# Patient Record
Sex: FEMALE | Race: OTHER | HISPANIC OR LATINO
[De-identification: names, ages, dates, MRNs, and addresses within clinical notes are randomized per-mention and may not be internally consistent; named-entity substitution may affect disease eponyms.]

---

## 2022-02-18 ENCOUNTER — ASOB RESULT (OUTPATIENT)
Age: 27
End: 2022-02-18

## 2022-02-18 ENCOUNTER — APPOINTMENT (OUTPATIENT)
Dept: MATERNAL FETAL MEDICINE | Facility: CLINIC | Age: 27
End: 2022-02-18
Payer: COMMERCIAL

## 2022-02-18 PROCEDURE — 99202 OFFICE O/P NEW SF 15 MIN: CPT | Mod: 95

## 2022-02-28 ENCOUNTER — ASOB RESULT (OUTPATIENT)
Age: 27
End: 2022-02-28

## 2022-02-28 ENCOUNTER — APPOINTMENT (OUTPATIENT)
Dept: ANTEPARTUM | Facility: CLINIC | Age: 27
End: 2022-02-28
Payer: COMMERCIAL

## 2022-02-28 ENCOUNTER — NON-APPOINTMENT (OUTPATIENT)
Age: 27
End: 2022-02-28

## 2022-02-28 PROCEDURE — 36415 COLL VENOUS BLD VENIPUNCTURE: CPT

## 2022-02-28 PROCEDURE — 76813 OB US NUCHAL MEAS 1 GEST: CPT

## 2022-02-28 PROCEDURE — ZZZZZ: CPT

## 2022-02-28 PROCEDURE — 36416 COLLJ CAPILLARY BLOOD SPEC: CPT

## 2022-03-02 LAB
1ST TRIMESTER DATA: NORMAL
ADDENDUM DOC: NORMAL
AFP PNL SERPL: NORMAL
AFP SERPL-ACNC: NORMAL
CLINICAL BIOCHEMIST REVIEW: NORMAL
FREE BETA HCG 1ST TRIMESTER: NORMAL
Lab: NORMAL
NASAL BONE: PRESENT
NOTES NTD: NORMAL
NT: NORMAL
PAPP-A SERPL-ACNC: NORMAL
TRISOMY 18/3: NORMAL

## 2022-03-08 ENCOUNTER — NON-APPOINTMENT (OUTPATIENT)
Age: 27
End: 2022-03-08

## 2022-03-15 ENCOUNTER — NON-APPOINTMENT (OUTPATIENT)
Age: 27
End: 2022-03-15

## 2022-03-15 DIAGNOSIS — Z87.59 PERSONAL HISTORY OF OTHER COMPLICATIONS OF PREGNANCY, CHILDBIRTH AND THE PUERPERIUM: ICD-10-CM

## 2022-03-15 DIAGNOSIS — Z78.9 OTHER SPECIFIED HEALTH STATUS: ICD-10-CM

## 2022-03-15 DIAGNOSIS — Z98.890 OTHER SPECIFIED POSTPROCEDURAL STATES: ICD-10-CM

## 2022-03-15 DIAGNOSIS — I10 ESSENTIAL (PRIMARY) HYPERTENSION: ICD-10-CM

## 2022-03-15 DIAGNOSIS — N91.2 AMENORRHEA, UNSPECIFIED: ICD-10-CM

## 2022-03-15 RX ORDER — CHROMIUM 200 MCG
TABLET ORAL
Refills: 0 | Status: ACTIVE | COMMUNITY

## 2022-03-15 RX ORDER — ASCORBIC ACID, CHOLECALCIFEROL, .ALPHA.-TOCOPHEROL ACETATE, DL-, PYRIDOXINE, FOLIC ACID, CYANOCOBALAMIN, CALCIUM, FERROUS FUMARATE, MAGNESIUM, DOCONEXENT 85; 200; 10; 25; 1; 12; 140; 27; 45; 300 [IU]/1; [IU]/1; [IU]/1; [IU]/1; MG/1; UG/1; MG/1; MG/1; MG/1; MG/1
CAPSULE, GELATIN COATED ORAL
Refills: 0 | Status: ACTIVE | COMMUNITY

## 2022-03-22 ENCOUNTER — APPOINTMENT (OUTPATIENT)
Dept: ANTEPARTUM | Facility: CLINIC | Age: 27
End: 2022-03-22
Payer: COMMERCIAL

## 2022-03-22 ENCOUNTER — NON-APPOINTMENT (OUTPATIENT)
Age: 27
End: 2022-03-22

## 2022-03-22 ENCOUNTER — APPOINTMENT (OUTPATIENT)
Dept: MATERNAL FETAL MEDICINE | Facility: CLINIC | Age: 27
End: 2022-03-22
Payer: COMMERCIAL

## 2022-03-22 VITALS
DIASTOLIC BLOOD PRESSURE: 82 MMHG | OXYGEN SATURATION: 99 % | RESPIRATION RATE: 18 BRPM | SYSTOLIC BLOOD PRESSURE: 132 MMHG | HEIGHT: 63 IN | HEART RATE: 99 BPM | BODY MASS INDEX: 42.7 KG/M2 | WEIGHT: 241 LBS

## 2022-03-22 DIAGNOSIS — Z82.49 FAMILY HISTORY OF ISCHEMIC HEART DISEASE AND OTHER DISEASES OF THE CIRCULATORY SYSTEM: ICD-10-CM

## 2022-03-22 PROCEDURE — 99215 OFFICE O/P EST HI 40 MIN: CPT

## 2022-03-22 PROCEDURE — 36415 COLL VENOUS BLD VENIPUNCTURE: CPT

## 2022-03-22 NOTE — DISCUSSION/SUMMARY
[FreeTextEntry1] : Patient is a 26-year-old -0-1-2 being seen today at 16 weeks for chronic hypertension and previous  section.\par \par Her obstetrical history is significant for delivery in 2018 of a liveborn male  weighing 8 pounds 10 ounces delivered at 39 weeks via elective  section.  That pregnancy was complicated by hypertension requiring medical intervention.  The  section was done secondary to a strong family history of cerebral aneurysm.  The patient has not had a study done to confirm that she has a cerebral aneurysm.  Subsequent pregnancy in 2019, a liveborn male  weighing 8 pounds 14 ounces delivered at 39 weeks via elective  section.  That pregnancy was also complicated by gestational hypertension treated with labetalol.  She has also had 1 first trimester complete miscarriage.\par \par Ultrasound was not performed today, but fetal cardiac activity was documented.  Vital signs today reveal a blood pressure of 132/82, maternal weight is 241 pounds consistent with a BMI of 42.69 kg.\par \par Chronic hypertension;\par \par The patient is currently on 200 mg of labetalol twice a day.  Blood pressure in pregnancy in patients with chronic hypertension should be 120-140 systolic over 80-90 diastolic.  The patient understands that should her systolic blood pressure be greater than 160 or diastolic blood pressure greater than 110 she should call the office for further evaluation.  We can utilize up to 2400 mg of labetalol daily.  In addition this patient is at increased risk for superimposed preeclampsia and she is currently on 1 low-dose aspirin a day.  She was advised to alternate 1 tablet 1 day and 2 tablets the other day which she will take until approximately 1 week prior to delivery.  Patient's with chronic hypertension are best delivered between 37 and 39 weeks.  I recommend baseline preeclamptic blood work and urine protein creatinine ratio be obtained at 20 weeks.  Rachel understands that poor fetal growth may occur and growth scans done monthly beginning at approximate 24 weeks is also recommended.  The possibility of  delivery with it's associated morbidity and mortality were discussed.  She will return for follow-up maternal-fetal medicine consultation at 24 weeks.  A growth scan will be done at that time.  Signs and symptoms of preeclampsia were discussed.  All the above was discussed with the patient, all her questions were answered.\par \par The patient has a BMI that is greater than 40 and a first-degree relative with diabetes and therefore 3-hour glucose tolerance test between 24 and 28 weeks is recommended.\par \par She has had ultrascreen evaluation and NIPS blood work performed which both demonstate low risk for fetal aneuploidy.  Sequential blood work was drawn today.\par \par COVID-19 vaccination;\par \par COVID-19 vaccination in pregnancy was discussed.  We advise pregnant patients to be vaccinated.  The patient has certain comorbidities that put her at increased risk for problems and/or complications should she have a Covid infection during pregnancy.  Risk and benefits of the vaccination were discussed and after all the patient's questions were answered the patient has declined vaccination during pregnancy.\par \par Her mother has hypertensive disease and her father has diabetes.  She has had 2  sections, rhinoplasty and an umbilical hernia repair.  She has no known allergies to medications and denies alcohol, tobacco or drug use.\par \par I spent a total of 47 minutes reviewing the patient's prenatal records, outside medical records, prenatal blood work and ultrasound reports counseling and coordinating care.\par \par Recommendations;\par \par 1.  Continue 200 mg of labetalol twice a day.\par 2.  Low-dose aspirin 1 tablet on odd days and two tablets on even days.\par 3.  Baseline preeclamptic blood work and urine protein creatinine ratio at 20 weeks.\par 4.  Sequential blood work drawn on our office today.\par 5.  Comprehensive ultrasound at 20 weeks is recommended.\par 6.  Growth scan and maternal-fetal medicine consultation in 8 weeks.\par 7.  3 hour glucose tolerance test between 24 and 28 weeks is recommended.

## 2022-03-23 ENCOUNTER — NON-APPOINTMENT (OUTPATIENT)
Age: 27
End: 2022-03-23

## 2022-03-24 ENCOUNTER — NON-APPOINTMENT (OUTPATIENT)
Age: 27
End: 2022-03-24

## 2022-03-25 LAB
1ST TRIMESTER DATA: NORMAL
2ND TRIMESTER DATA: NORMAL
ADDENDUM DOC: NORMAL
AFP PNL SERPL: NORMAL
AFP SERPL-ACNC: NORMAL
AFP SERPL-ACNC: NORMAL
B-HCG FREE SERPL-MCNC: NORMAL
CLINICAL BIOCHEMIST REVIEW: NORMAL
FREE BETA HCG 1ST TRIMESTER: NORMAL
INHIBIN A SERPL-MCNC: NORMAL
NASAL BONE: PRESENT
NOTES NTD: NORMAL
NT: NORMAL
PAPP-A SERPL-ACNC: NORMAL
U ESTRIOL SERPL-SCNC: NORMAL

## 2022-03-28 ENCOUNTER — NON-APPOINTMENT (OUTPATIENT)
Age: 27
End: 2022-03-28

## 2022-03-29 ENCOUNTER — APPOINTMENT (OUTPATIENT)
Dept: OBGYN | Facility: CLINIC | Age: 27
End: 2022-03-29
Payer: COMMERCIAL

## 2022-03-29 VITALS
WEIGHT: 247 LBS | SYSTOLIC BLOOD PRESSURE: 138 MMHG | HEART RATE: 91 BPM | DIASTOLIC BLOOD PRESSURE: 79 MMHG | BODY MASS INDEX: 43.75 KG/M2

## 2022-03-29 DIAGNOSIS — Z00.00 ENCOUNTER FOR GENERAL ADULT MEDICAL EXAMINATION W/OUT ABNORMAL FINDINGS: ICD-10-CM

## 2022-03-29 LAB
BILIRUB UR QL STRIP: NORMAL
GLUCOSE UR-MCNC: NORMAL
HCG UR QL: 0.2 EU/DL
HGB UR QL STRIP.AUTO: NORMAL
KETONES UR-MCNC: NORMAL
LEUKOCYTE ESTERASE UR QL STRIP: NORMAL
NITRITE UR QL STRIP: NORMAL
PH UR STRIP: 5.5
PROT UR STRIP-MCNC: NORMAL
SP GR UR STRIP: 1.03

## 2022-03-29 PROCEDURE — 0502F SUBSEQUENT PRENATAL CARE: CPT

## 2022-04-01 LAB — BACTERIA UR CULT: NORMAL

## 2022-04-19 ENCOUNTER — APPOINTMENT (OUTPATIENT)
Dept: ANTEPARTUM | Facility: CLINIC | Age: 27
End: 2022-04-19
Payer: MEDICAID

## 2022-04-19 ENCOUNTER — ASOB RESULT (OUTPATIENT)
Age: 27
End: 2022-04-19

## 2022-04-19 PROCEDURE — 76827 ECHO EXAM OF FETAL HEART: CPT

## 2022-04-19 PROCEDURE — 76811 OB US DETAILED SNGL FETUS: CPT

## 2022-04-25 ENCOUNTER — NON-APPOINTMENT (OUTPATIENT)
Age: 27
End: 2022-04-25

## 2022-04-26 ENCOUNTER — APPOINTMENT (OUTPATIENT)
Dept: OBGYN | Facility: CLINIC | Age: 27
End: 2022-04-26

## 2022-04-26 ENCOUNTER — APPOINTMENT (OUTPATIENT)
Dept: OBGYN | Facility: CLINIC | Age: 27
End: 2022-04-26
Payer: MEDICAID

## 2022-04-26 VITALS
HEART RATE: 114 BPM | SYSTOLIC BLOOD PRESSURE: 137 MMHG | BODY MASS INDEX: 44.11 KG/M2 | WEIGHT: 249 LBS | DIASTOLIC BLOOD PRESSURE: 91 MMHG

## 2022-04-26 LAB
BILIRUB UR QL STRIP: NORMAL
GLUCOSE UR-MCNC: NORMAL
HCG UR QL: 0.2 EU/DL
HGB UR QL STRIP.AUTO: NORMAL
KETONES UR-MCNC: NORMAL
LEUKOCYTE ESTERASE UR QL STRIP: NORMAL
NITRITE UR QL STRIP: NORMAL
PH UR STRIP: 6
PROT UR STRIP-MCNC: NORMAL
SP GR UR STRIP: 1.03

## 2022-04-26 PROCEDURE — 99213 OFFICE O/P EST LOW 20 MIN: CPT

## 2022-04-27 LAB
APPEARANCE: CLEAR
BILIRUBIN URINE: NEGATIVE
BLOOD URINE: NORMAL
COLOR: YELLOW
GLUCOSE QUALITATIVE U: NEGATIVE
KETONES URINE: NEGATIVE
LEUKOCYTE ESTERASE URINE: ABNORMAL
NITRITE URINE: NEGATIVE
PH URINE: 6.5
PROTEIN URINE: NORMAL
SPECIFIC GRAVITY URINE: 1.02
UROBILINOGEN URINE: NORMAL

## 2022-04-28 LAB — BACTERIA UR CULT: NORMAL

## 2022-05-13 ENCOUNTER — NON-APPOINTMENT (OUTPATIENT)
Age: 27
End: 2022-05-13

## 2022-05-17 ENCOUNTER — APPOINTMENT (OUTPATIENT)
Dept: ANTEPARTUM | Facility: CLINIC | Age: 27
End: 2022-05-17
Payer: MEDICAID

## 2022-05-17 ENCOUNTER — APPOINTMENT (OUTPATIENT)
Dept: MATERNAL FETAL MEDICINE | Facility: CLINIC | Age: 27
End: 2022-05-17
Payer: MEDICAID

## 2022-05-17 ENCOUNTER — ASOB RESULT (OUTPATIENT)
Age: 27
End: 2022-05-17

## 2022-05-17 VITALS
OXYGEN SATURATION: 98 % | SYSTOLIC BLOOD PRESSURE: 134 MMHG | BODY MASS INDEX: 43.41 KG/M2 | HEART RATE: 102 BPM | WEIGHT: 245 LBS | HEIGHT: 63 IN | RESPIRATION RATE: 18 BRPM | DIASTOLIC BLOOD PRESSURE: 86 MMHG

## 2022-05-17 PROCEDURE — 99213 OFFICE O/P EST LOW 20 MIN: CPT

## 2022-05-17 PROCEDURE — 76816 OB US FOLLOW-UP PER FETUS: CPT

## 2022-05-17 NOTE — DISCUSSION/SUMMARY
[FreeTextEntry1] : Please she see the previous consultation for the patient's medical and obstetrical history.  The patient is being seen today at 24 weeks for chronic hypertension, maternal obesity and previous history of  section.\par \par Growth scan was performed today and reveals a single viable intrauterine gestation with the estimated fetal weight of 1 pound 10 ounce which is consistent with the 60th percentile.  Vertex presentation with an anterior placenta seen and the amniotic fluid index is normal at 14.75 cm.  Vital signs today reveal a blood pressure 134/86 and maternal weight is 245 pounds which is consistent with a BMI of 43.4 kg.\par \par Chronic hypertension;\par \par Patient is taking 200 mg of labetalol twice a day.  Evaluation of her prenatal chart reveals that her blood pressure is running 132-138/79-91.  This is appropriate blood pressure for a pregnant patient on medication for chronic hypertension.  At this time the patient will continue on 200 mg of labetalol twice a day.  In addition she is on low-dose aspirin taking 1 tablet on odd days and 2 tablet on even days which she will take until approximately 1 week prior to delivery.  A growth scan in 1 month is recommended.  Baseline preeclamptic blood work was performed in your office and was found to be within normal limits.  Signs and symptoms of superimposed preeclampsia were discussed.  The patient was advised to test her blood pressure at home.  Should her systolic blood pressure be greater than 160 or diastolic blood pressure be greater than 110 she will call the office for evaluation.  She understands that there is a possible need for  delivery secondary to chronic hypertension and/or superimposed preeclampsia.  Delivery between 37 and 39 weeks will be recommended.  All the above was discussed with the patient, all of her questions were answered.\par \par The patient's BMI is 43.4kg.  I do recommend a 3-hour glucose tolerance test be performed between 24 and 28 weeks.\par \par COVID-19 vaccination;\par \par COVID-19 vaccination in pregnancy was again discussed.  Risks and benefits of the vaccination were again discussed and after all the patient's questions were answered the patient continues to decline vaccination during pregnancy. She understands that she has a comorbidity that puts her at increased risk for problems or complications should she have Covid infection during pregnancy.\par \par I spent a total of 28 minutes reviewing the patient's prenatal record, prenatal blood work, previous consultations and ultrasound reports counseling and coordinating care.\par \par Recommendations;\par \par 1.  Continue labetalol 200 mg twice a day.\par 2.  Patient will call your office if her systolic blood pressure is greater than 160 or diastolic blood pressure is greater than 110.\par 3.  Continue low-dose aspirin until 1 week prior to delivery.\par 4.  Growth scan in 1 month is recommended.\par 5.  3-hour glucose tolerance test between 24 and 28 weeks is suggested.

## 2022-05-24 ENCOUNTER — APPOINTMENT (OUTPATIENT)
Dept: OBGYN | Facility: CLINIC | Age: 27
End: 2022-05-24
Payer: MEDICAID

## 2022-05-24 VITALS
HEART RATE: 98 BPM | DIASTOLIC BLOOD PRESSURE: 86 MMHG | SYSTOLIC BLOOD PRESSURE: 135 MMHG | WEIGHT: 252 LBS | BODY MASS INDEX: 44.64 KG/M2

## 2022-05-24 PROBLEM — Z00.00 ENCOUNTER FOR PREVENTIVE HEALTH EXAMINATION: Noted: 2022-05-24

## 2022-05-24 PROCEDURE — 0502F SUBSEQUENT PRENATAL CARE: CPT

## 2022-06-14 ENCOUNTER — APPOINTMENT (OUTPATIENT)
Dept: OBGYN | Facility: CLINIC | Age: 27
End: 2022-06-14
Payer: MEDICAID

## 2022-06-14 VITALS
DIASTOLIC BLOOD PRESSURE: 84 MMHG | WEIGHT: 249 LBS | BODY MASS INDEX: 44.11 KG/M2 | SYSTOLIC BLOOD PRESSURE: 142 MMHG | HEART RATE: 100 BPM

## 2022-06-14 PROCEDURE — 0502F SUBSEQUENT PRENATAL CARE: CPT

## 2022-06-17 ENCOUNTER — ASOB RESULT (OUTPATIENT)
Age: 27
End: 2022-06-17

## 2022-06-17 ENCOUNTER — APPOINTMENT (OUTPATIENT)
Dept: ANTEPARTUM | Facility: CLINIC | Age: 27
End: 2022-06-17
Payer: MEDICAID

## 2022-06-17 PROCEDURE — 76816 OB US FOLLOW-UP PER FETUS: CPT

## 2022-06-17 PROCEDURE — 76820 UMBILICAL ARTERY ECHO: CPT

## 2022-06-17 PROCEDURE — 93976 VASCULAR STUDY: CPT

## 2022-06-17 PROCEDURE — 76819 FETAL BIOPHYS PROFIL W/O NST: CPT

## 2022-06-20 ENCOUNTER — NON-APPOINTMENT (OUTPATIENT)
Age: 27
End: 2022-06-20

## 2022-06-23 ENCOUNTER — NON-APPOINTMENT (OUTPATIENT)
Age: 27
End: 2022-06-23

## 2022-07-05 ENCOUNTER — APPOINTMENT (OUTPATIENT)
Dept: OBGYN | Facility: CLINIC | Age: 27
End: 2022-07-05

## 2022-07-05 VITALS
SYSTOLIC BLOOD PRESSURE: 136 MMHG | BODY MASS INDEX: 44.64 KG/M2 | WEIGHT: 252 LBS | DIASTOLIC BLOOD PRESSURE: 86 MMHG | HEART RATE: 103 BPM

## 2022-07-05 LAB
BILIRUB UR QL STRIP: NORMAL
GLUCOSE UR-MCNC: NORMAL
HCG UR QL: 0.2 EU/DL
HGB UR QL STRIP.AUTO: NORMAL
KETONES UR-MCNC: NORMAL
LEUKOCYTE ESTERASE UR QL STRIP: NORMAL
NITRITE UR QL STRIP: NORMAL
PH UR STRIP: 6.5
PROT UR STRIP-MCNC: NORMAL
SP GR UR STRIP: 1.02

## 2022-07-05 PROCEDURE — 0502F SUBSEQUENT PRENATAL CARE: CPT

## 2022-07-06 LAB
APPEARANCE: CLEAR
BILIRUBIN URINE: NEGATIVE
BLOOD URINE: ABNORMAL
COLOR: YELLOW
GLUCOSE QUALITATIVE U: NEGATIVE
KETONES URINE: NEGATIVE
LEUKOCYTE ESTERASE URINE: ABNORMAL
NITRITE URINE: NEGATIVE
PH URINE: 6.5
PROTEIN URINE: NORMAL
SPECIFIC GRAVITY URINE: 1.02
UROBILINOGEN URINE: NORMAL

## 2022-07-07 LAB — BACTERIA UR CULT: NORMAL

## 2022-07-12 ENCOUNTER — ASOB RESULT (OUTPATIENT)
Age: 27
End: 2022-07-12

## 2022-07-12 ENCOUNTER — APPOINTMENT (OUTPATIENT)
Dept: ANTEPARTUM | Facility: CLINIC | Age: 27
End: 2022-07-12

## 2022-07-12 PROCEDURE — 76816 OB US FOLLOW-UP PER FETUS: CPT

## 2022-07-12 PROCEDURE — 76819 FETAL BIOPHYS PROFIL W/O NST: CPT

## 2022-07-15 DIAGNOSIS — Z3A.16 16 WEEKS GESTATION OF PREGNANCY: ICD-10-CM

## 2022-07-15 RX ORDER — ASPIRIN 81 MG/1
81 TABLET, CHEWABLE ORAL DAILY
Qty: 30 | Refills: 6 | Status: ACTIVE | COMMUNITY
Start: 2022-03-22

## 2022-07-15 RX ORDER — LABETALOL HYDROCHLORIDE 200 MG/1
200 TABLET, FILM COATED ORAL
Qty: 60 | Refills: 0 | Status: ACTIVE | COMMUNITY
Start: 2022-04-25

## 2022-07-15 RX ORDER — LABETALOL HYDROCHLORIDE 200 MG/1
200 TABLET, FILM COATED ORAL TWICE DAILY
Refills: 0 | Status: DISCONTINUED | COMMUNITY
End: 2022-07-15

## 2022-07-19 ENCOUNTER — ASOB RESULT (OUTPATIENT)
Age: 27
End: 2022-07-19

## 2022-07-19 ENCOUNTER — APPOINTMENT (OUTPATIENT)
Dept: OBGYN | Facility: CLINIC | Age: 27
End: 2022-07-19

## 2022-07-19 ENCOUNTER — APPOINTMENT (OUTPATIENT)
Dept: ANTEPARTUM | Facility: CLINIC | Age: 27
End: 2022-07-19

## 2022-07-19 VITALS
SYSTOLIC BLOOD PRESSURE: 131 MMHG | BODY MASS INDEX: 44.64 KG/M2 | HEART RATE: 98 BPM | DIASTOLIC BLOOD PRESSURE: 81 MMHG | WEIGHT: 252 LBS

## 2022-07-19 LAB
BILIRUB UR QL STRIP: NORMAL
GLUCOSE UR-MCNC: NORMAL
HCG UR QL: 0.2 EU/DL
HGB UR QL STRIP.AUTO: NORMAL
KETONES UR-MCNC: NORMAL
LEUKOCYTE ESTERASE UR QL STRIP: NORMAL
NITRITE UR QL STRIP: NORMAL
PH UR STRIP: 7
PROT UR STRIP-MCNC: NORMAL
SP GR UR STRIP: 1.01

## 2022-07-19 PROCEDURE — 76818 FETAL BIOPHYS PROFILE W/NST: CPT

## 2022-07-19 PROCEDURE — 0502F SUBSEQUENT PRENATAL CARE: CPT

## 2022-07-19 PROCEDURE — ZZZZZ: CPT

## 2022-07-21 ENCOUNTER — NON-APPOINTMENT (OUTPATIENT)
Age: 27
End: 2022-07-21

## 2022-07-26 ENCOUNTER — APPOINTMENT (OUTPATIENT)
Dept: ANTEPARTUM | Facility: CLINIC | Age: 27
End: 2022-07-26

## 2022-07-26 ENCOUNTER — ASOB RESULT (OUTPATIENT)
Age: 27
End: 2022-07-26

## 2022-07-26 PROCEDURE — 76818 FETAL BIOPHYS PROFILE W/NST: CPT

## 2022-07-26 PROCEDURE — ZZZZZ: CPT

## 2022-08-02 ENCOUNTER — ASOB RESULT (OUTPATIENT)
Age: 27
End: 2022-08-02

## 2022-08-02 ENCOUNTER — APPOINTMENT (OUTPATIENT)
Dept: OBGYN | Facility: CLINIC | Age: 27
End: 2022-08-02

## 2022-08-02 ENCOUNTER — APPOINTMENT (OUTPATIENT)
Dept: ANTEPARTUM | Facility: CLINIC | Age: 27
End: 2022-08-02

## 2022-08-02 VITALS
WEIGHT: 250 LBS | DIASTOLIC BLOOD PRESSURE: 85 MMHG | HEART RATE: 104 BPM | SYSTOLIC BLOOD PRESSURE: 139 MMHG | BODY MASS INDEX: 44.29 KG/M2

## 2022-08-02 PROCEDURE — 76816 OB US FOLLOW-UP PER FETUS: CPT

## 2022-08-02 PROCEDURE — 76818 FETAL BIOPHYS PROFILE W/NST: CPT

## 2022-08-02 PROCEDURE — 0502F SUBSEQUENT PRENATAL CARE: CPT

## 2022-08-02 PROCEDURE — ZZZZZ: CPT

## 2022-08-03 LAB
APPEARANCE: ABNORMAL
BILIRUBIN URINE: NEGATIVE
BLOOD URINE: NEGATIVE
COLOR: YELLOW
GLUCOSE QUALITATIVE U: NEGATIVE
KETONES URINE: NEGATIVE
LEUKOCYTE ESTERASE URINE: ABNORMAL
NITRITE URINE: NEGATIVE
PH URINE: 6.5
PROTEIN URINE: ABNORMAL
SPECIFIC GRAVITY URINE: 1.02
UROBILINOGEN URINE: NORMAL

## 2022-08-04 LAB — BACTERIA UR CULT: NORMAL

## 2022-08-05 ENCOUNTER — APPOINTMENT (OUTPATIENT)
Dept: ANTEPARTUM | Facility: CLINIC | Age: 27
End: 2022-08-05

## 2022-08-05 LAB — B-HEM STREP SPEC QL CULT: NORMAL

## 2022-08-09 ENCOUNTER — APPOINTMENT (OUTPATIENT)
Dept: OBGYN | Facility: CLINIC | Age: 27
End: 2022-08-09

## 2022-08-09 ENCOUNTER — APPOINTMENT (OUTPATIENT)
Dept: ANTEPARTUM | Facility: CLINIC | Age: 27
End: 2022-08-09

## 2022-08-09 ENCOUNTER — ASOB RESULT (OUTPATIENT)
Age: 27
End: 2022-08-09

## 2022-08-09 VITALS
WEIGHT: 254 LBS | BODY MASS INDEX: 44.99 KG/M2 | HEART RATE: 105 BPM | SYSTOLIC BLOOD PRESSURE: 133 MMHG | DIASTOLIC BLOOD PRESSURE: 91 MMHG

## 2022-08-09 PROCEDURE — 76818 FETAL BIOPHYS PROFILE W/NST: CPT

## 2022-08-09 PROCEDURE — ZZZZZ: CPT

## 2022-08-09 PROCEDURE — 0502F SUBSEQUENT PRENATAL CARE: CPT

## 2022-08-12 ENCOUNTER — APPOINTMENT (OUTPATIENT)
Dept: ANTEPARTUM | Facility: CLINIC | Age: 27
End: 2022-08-12

## 2022-08-12 ENCOUNTER — ASOB RESULT (OUTPATIENT)
Age: 27
End: 2022-08-12

## 2022-08-12 PROCEDURE — 76816 OB US FOLLOW-UP PER FETUS: CPT

## 2022-08-16 ENCOUNTER — APPOINTMENT (OUTPATIENT)
Dept: ANTEPARTUM | Facility: CLINIC | Age: 27
End: 2022-08-16

## 2022-08-16 ENCOUNTER — APPOINTMENT (OUTPATIENT)
Dept: OBGYN | Facility: CLINIC | Age: 27
End: 2022-08-16

## 2022-08-16 ENCOUNTER — ASOB RESULT (OUTPATIENT)
Age: 27
End: 2022-08-16

## 2022-08-16 VITALS
SYSTOLIC BLOOD PRESSURE: 133 MMHG | BODY MASS INDEX: 44.64 KG/M2 | WEIGHT: 252 LBS | DIASTOLIC BLOOD PRESSURE: 90 MMHG | HEART RATE: 99 BPM

## 2022-08-16 LAB
BILIRUB UR QL STRIP: NORMAL
GLUCOSE UR-MCNC: NORMAL
HCG UR QL: 0.2 EU/DL
HGB UR QL STRIP.AUTO: NORMAL
KETONES UR-MCNC: NORMAL
LEUKOCYTE ESTERASE UR QL STRIP: NORMAL
NITRITE UR QL STRIP: NORMAL
PH UR STRIP: 6
PROT UR STRIP-MCNC: NORMAL
SP GR UR STRIP: 1.02

## 2022-08-16 PROCEDURE — 93976 VASCULAR STUDY: CPT

## 2022-08-16 PROCEDURE — ZZZZZ: CPT

## 2022-08-16 PROCEDURE — 76820 UMBILICAL ARTERY ECHO: CPT

## 2022-08-16 PROCEDURE — 76821 MIDDLE CEREBRAL ARTERY ECHO: CPT

## 2022-08-16 PROCEDURE — 0502F SUBSEQUENT PRENATAL CARE: CPT

## 2022-08-16 PROCEDURE — 76818 FETAL BIOPHYS PROFILE W/NST: CPT

## 2022-08-17 LAB
APPEARANCE: CLEAR
BILIRUBIN URINE: NEGATIVE
BLOOD URINE: NORMAL
COLOR: NORMAL
GLUCOSE QUALITATIVE U: NEGATIVE
KETONES URINE: NEGATIVE
LEUKOCYTE ESTERASE URINE: NEGATIVE
NITRITE URINE: NEGATIVE
PH URINE: 6
PROTEIN URINE: NEGATIVE
SPECIFIC GRAVITY URINE: 1.02
UROBILINOGEN URINE: NORMAL

## 2022-08-18 LAB — BACTERIA UR CULT: NORMAL

## 2022-08-19 ENCOUNTER — APPOINTMENT (OUTPATIENT)
Dept: ANTEPARTUM | Facility: CLINIC | Age: 27
End: 2022-08-19

## 2022-08-23 ENCOUNTER — APPOINTMENT (OUTPATIENT)
Dept: OBGYN | Facility: CLINIC | Age: 27
End: 2022-08-23

## 2022-08-23 ENCOUNTER — ASOB RESULT (OUTPATIENT)
Age: 27
End: 2022-08-23

## 2022-08-23 ENCOUNTER — APPOINTMENT (OUTPATIENT)
Dept: ANTEPARTUM | Facility: CLINIC | Age: 27
End: 2022-08-23

## 2022-08-23 VITALS
SYSTOLIC BLOOD PRESSURE: 138 MMHG | WEIGHT: 252 LBS | DIASTOLIC BLOOD PRESSURE: 86 MMHG | BODY MASS INDEX: 44.64 KG/M2 | HEART RATE: 84 BPM

## 2022-08-23 LAB
BILIRUB UR QL STRIP: NORMAL
GLUCOSE UR-MCNC: NORMAL
HCG UR QL: 0.2 EU/DL
HGB UR QL STRIP.AUTO: NORMAL
KETONES UR-MCNC: NORMAL
LEUKOCYTE ESTERASE UR QL STRIP: NORMAL
NITRITE UR QL STRIP: NORMAL
PH UR STRIP: 7
PROT UR STRIP-MCNC: NORMAL
SP GR UR STRIP: 1.02

## 2022-08-23 PROCEDURE — 0502F SUBSEQUENT PRENATAL CARE: CPT

## 2022-08-23 PROCEDURE — 76818 FETAL BIOPHYS PROFILE W/NST: CPT

## 2022-08-23 PROCEDURE — ZZZZZ: CPT

## 2022-08-24 ENCOUNTER — OUTPATIENT (OUTPATIENT)
Dept: OUTPATIENT SERVICES | Facility: HOSPITAL | Age: 27
LOS: 1 days | End: 2022-08-24

## 2022-08-24 DIAGNOSIS — Z01.818 ENCOUNTER FOR OTHER PREPROCEDURAL EXAMINATION: ICD-10-CM

## 2022-08-24 LAB — HIV 1 & 2 AB SERPL IA.RAPID: SIGNIFICANT CHANGE UP

## 2022-08-25 LAB
HIV 1+2 AB+HIV1 P24 AG SERPL QL IA: SIGNIFICANT CHANGE UP
MEV IGG SER-ACNC: 47.4 AU/ML — SIGNIFICANT CHANGE UP
MEV IGG+IGM SER-IMP: POSITIVE — SIGNIFICANT CHANGE UP

## 2022-08-26 ENCOUNTER — APPOINTMENT (OUTPATIENT)
Dept: ANTEPARTUM | Facility: CLINIC | Age: 27
End: 2022-08-26

## 2022-08-29 RX ORDER — OXYTOCIN 10 UNIT/ML
333.33 VIAL (ML) INJECTION
Qty: 20 | Refills: 0 | Status: DISCONTINUED | OUTPATIENT
Start: 2022-08-31 | End: 2022-09-02

## 2022-08-29 RX ORDER — LABETALOL HCL 100 MG
200 TABLET ORAL
Refills: 0 | Status: DISCONTINUED | OUTPATIENT
Start: 2022-08-31 | End: 2022-09-02

## 2022-08-29 NOTE — OB PROVIDER H&P - HISTORY OF PRESENT ILLNESS
Darren Rachel Damico is a 27 year old  at 39w3d by LMP who presents to L&D for rCS, breech on  and complicated by cHTN on labetalol 200 BID and suspected fetal macrosomia EFW 92%ile on .      AL: 22   LMP: 21      Pregnancy course:   Breech    cHTN on labetalol with elevated uterine artery Doppler resolved 22   Suspected fetal macrosomia EFW 92%ile on    Breech   Low estriol 0.5%ile      Obhx:   G1 () SAB with D&C   G2 () elective pCS complicated by gHTN   G3 () rCS complicated by gHTN on labetalol   Gynhx: Denies abnormal Paps, STIs   Pmhx: Denies   Pshx: C/Sx2, D&C, umbilical hernia repair, rhinoplasty, wrist cyst removal   Meds: Labetalol 200 BID, ASA, PNV   Allergies: NKDA   SocialHx: Denies alcohol, tobacco, illicit drug use during pregnancy      BMI: 44.63   Ultrasound : breech, posterior   EFW : 3494, 92%ile  Darren Rachel Damico is a 27 year old  at 39w3d by LMP who presents to L&D for rCS, breech on  and complicated by cHTN on labetalol 200 BID and suspected fetal macrosomia EFW 92%ile on .      AL: 22   LMP: 21      Pregnancy course:   Breech    cHTN on labetalol with elevated uterine artery Doppler resolved 22   Suspected fetal macrosomia EFW 92%ile on    Low estriol 0.5%ile        Obhx:   G1 () SAB, no intervention   G2 () elective pCS, FHx of aneurysms, complicated by gHTN   G3 () rCS complicated by gHTN on labetalol   Gynhx: Denies abnormal Paps, STIs   Pmhx: Denies   Pshx: C/Sx2, D&C, umbilical hernia repair, rhinoplasty, wrist cyst removal   Meds: Labetalol 200 BID, ASA, PNV   Allergies: NKDA   SocialHx: Denies alcohol, tobacco, illicit drug use during pregnancy      BMI: 44.63   Ultrasound : breech, posterior   EFW : 3494, 92%ile  Darren Rachel Damico is a 27 year old  at 39w3d by LMP who presents to L&D for scheduled rCS. Patient denies LOF, VB, and contractions. Reports good fetal movements. Denies fever chills, cough, headaches, SOB, CP, abdominal pain, urinary symptoms or new-onset swelling. No other concerns at this time.    AL: 22   LMP: 21      Pregnancy course:   Breech presentation , now resolved  cHTN on labetalol 200 BID  Elevated uterine artery Doppler resolved 22   Suspected fetal macrosomia EFW 92%ile on    Low estriol 0.5%ile        Obhx:   G1 () SAB, no intervention   G2 () elective pCS, FHx of aneurysms, complicated by gHTN   G3 () rCS complicated by gHTN on labetalol   Gynhx: Denies abnormal Paps, STIs   Pmhx: Denies   Pshx: C/Sx2, umbilical hernia repair, rhinoplasty, wrist cyst removal   Meds: Labetalol 200 BID, ASA, PNV   Allergies: NKDA   SocialHx: Denies alcohol, tobacco, illicit drug use during pregnancy      BMI: 44.63   Ultrasound : breech, posterior   EFW : 3494, 92%ile

## 2022-08-29 NOTE — OB PROVIDER H&P - NSHPPHYSICALEXAM_GEN_ALL_CORE
HR: 86 (08-31-22 @ 09:29) (86 - 86)  BP: 135/73 (08-31-22 @ 09:29) (135/73 - 135/73)    Gen: NAD, well-appearing  Heart: S1 S2, RRR  Lungs: CTAB  Abd: soft, gravid  Ext: non-edematous, non-tender     FHT: baseline FHR 120s, moderate variability, +acels, -decels, Category I  [place]: uterine irritability  Bedside Sono: vertex, posterior placenta

## 2022-08-29 NOTE — OB PROVIDER H&P - ASSESSMENT
Darren Rachel Damico is a 27 year old  at 39w3d by LMP who presents to L&D for rCS, breech on  and complicated by cHTN on labetalol 200 BID and suspected fetal macrosomia EFW 92%ile on .   -Admit to L&D  -Consent  -Admission labs  -NPO, except ice chips   -IV fluids  -Labor: Intact/*ROM. Latent/Active labor. Kyle *.   -Fetus: Cat I tracing. Continuous toco and fetal monitoring.   -GBS: Negative. Preop ancef ordered  -Analgesia:  -cHTN on labetalol ordered. PEC labs ordered   Darren Rachel Damico is a 27 year old  at 39w3d by LMP who presents to L&D for scheduled rCS.    -Admit to L&D  -Consent  -Admission labs  -NPO, except ice chips   -IV fluids  -Fetus: Cat I tracing. Continuous toco and fetal monitoring.   -GBS: Negative. Preop ancef ordered  -Analgesia: spinal  -cHTN on labetalol ordered. PEC labs ordered    Discussed with Dr. Salazar

## 2022-08-29 NOTE — OB PROVIDER H&P - NSPPHRISKEVAL_OBGYN_ALL_OB
Prior , uterine surgery, or multiple laparotomies BMI >40/Prior , uterine surgery, or multiple laparotomies

## 2022-08-29 NOTE — OB PROVIDER H&P - NS_GESTAGE_OBGYN_ALL_OB_FT
- family meeting on 12/14: full code  - family to discuss feeding tube, risks explained  - PT recs: extended care facility 39w1d

## 2022-08-30 ENCOUNTER — NON-APPOINTMENT (OUTPATIENT)
Age: 27
End: 2022-08-30

## 2022-08-30 ENCOUNTER — APPOINTMENT (OUTPATIENT)
Dept: OBGYN | Facility: CLINIC | Age: 27
End: 2022-08-30

## 2022-08-30 ENCOUNTER — TRANSCRIPTION ENCOUNTER (OUTPATIENT)
Age: 27
End: 2022-08-30

## 2022-08-30 VITALS
BODY MASS INDEX: 45.36 KG/M2 | HEIGHT: 63 IN | HEART RATE: 93 BPM | WEIGHT: 256 LBS | DIASTOLIC BLOOD PRESSURE: 86 MMHG | SYSTOLIC BLOOD PRESSURE: 137 MMHG

## 2022-08-30 PROCEDURE — 0502F SUBSEQUENT PRENATAL CARE: CPT

## 2022-08-30 PROCEDURE — 59426 ANTEPARTUM CARE ONLY: CPT

## 2022-08-31 ENCOUNTER — APPOINTMENT (OUTPATIENT)
Dept: OBGYN | Facility: HOSPITAL | Age: 27
End: 2022-08-31

## 2022-08-31 ENCOUNTER — INPATIENT (INPATIENT)
Facility: HOSPITAL | Age: 27
LOS: 1 days | Discharge: HOME | End: 2022-09-02
Attending: OBSTETRICS & GYNECOLOGY | Admitting: OBSTETRICS & GYNECOLOGY
Payer: MEDICAID

## 2022-08-31 VITALS — DIASTOLIC BLOOD PRESSURE: 73 MMHG | HEART RATE: 86 BPM | SYSTOLIC BLOOD PRESSURE: 135 MMHG

## 2022-08-31 DIAGNOSIS — O34.211 MATERNAL CARE FOR LOW TRANSVERSE SCAR FROM PREVIOUS CESAREAN DELIVERY: ICD-10-CM

## 2022-08-31 DIAGNOSIS — O34.219 MATERNAL CARE FOR UNSPECIFIED TYPE SCAR FROM PREVIOUS CESAREAN DELIVERY: ICD-10-CM

## 2022-08-31 LAB
ABO RH CONFIRMATION: SIGNIFICANT CHANGE UP
ALBUMIN SERPL ELPH-MCNC: 3.7 G/DL — SIGNIFICANT CHANGE UP (ref 3.3–5.2)
ALP SERPL-CCNC: 92 U/L — SIGNIFICANT CHANGE UP (ref 40–120)
ALT FLD-CCNC: 10 U/L — SIGNIFICANT CHANGE UP
ANION GAP SERPL CALC-SCNC: 15 MMOL/L — SIGNIFICANT CHANGE UP (ref 5–17)
APPEARANCE UR: CLEAR — SIGNIFICANT CHANGE UP
APPEARANCE: ABNORMAL
APTT BLD: 28.3 SEC — SIGNIFICANT CHANGE UP (ref 27.5–35.5)
AST SERPL-CCNC: 16 U/L — SIGNIFICANT CHANGE UP
BACTERIA # UR AUTO: ABNORMAL
BASOPHILS # BLD AUTO: 0.04 K/UL — SIGNIFICANT CHANGE UP (ref 0–0.2)
BASOPHILS NFR BLD AUTO: 0.5 % — SIGNIFICANT CHANGE UP (ref 0–2)
BILIRUB SERPL-MCNC: 0.4 MG/DL — SIGNIFICANT CHANGE UP (ref 0.4–2)
BILIRUB UR-MCNC: NEGATIVE — SIGNIFICANT CHANGE UP
BILIRUBIN URINE: NEGATIVE
BLD GP AB SCN SERPL QL: SIGNIFICANT CHANGE UP
BLOOD URINE: NEGATIVE
BUN SERPL-MCNC: 8.1 MG/DL — SIGNIFICANT CHANGE UP (ref 8–20)
CALCIUM SERPL-MCNC: 9 MG/DL — SIGNIFICANT CHANGE UP (ref 8.4–10.5)
CHLORIDE SERPL-SCNC: 103 MMOL/L — SIGNIFICANT CHANGE UP (ref 98–107)
CO2 SERPL-SCNC: 20 MMOL/L — LOW (ref 22–29)
COLOR SPEC: YELLOW — SIGNIFICANT CHANGE UP
COLOR: YELLOW
COVID-19 SPIKE DOMAIN AB INTERP: POSITIVE
COVID-19 SPIKE DOMAIN ANTIBODY RESULT: >250 U/ML — HIGH
CREAT ?TM UR-MCNC: 83 MG/DL — SIGNIFICANT CHANGE UP
CREAT SERPL-MCNC: 0.34 MG/DL — LOW (ref 0.5–1.3)
DIFF PNL FLD: ABNORMAL
EGFR: 145 ML/MIN/1.73M2 — SIGNIFICANT CHANGE UP
EOSINOPHIL # BLD AUTO: 0.02 K/UL — SIGNIFICANT CHANGE UP (ref 0–0.5)
EOSINOPHIL NFR BLD AUTO: 0.3 % — SIGNIFICANT CHANGE UP (ref 0–6)
EPI CELLS # UR: SIGNIFICANT CHANGE UP
FIBRINOGEN PPP-MCNC: 576 MG/DL — HIGH (ref 330–520)
GLUCOSE QUALITATIVE U: NEGATIVE
GLUCOSE SERPL-MCNC: 73 MG/DL — SIGNIFICANT CHANGE UP (ref 70–99)
GLUCOSE UR QL: NEGATIVE MG/DL — SIGNIFICANT CHANGE UP
HCT VFR BLD CALC: 35.1 % — SIGNIFICANT CHANGE UP (ref 34.5–45)
HGB BLD-MCNC: 11.8 G/DL — SIGNIFICANT CHANGE UP (ref 11.5–15.5)
IMM GRANULOCYTES NFR BLD AUTO: 0.4 % — SIGNIFICANT CHANGE UP (ref 0–1.5)
INR BLD: 0.99 RATIO — SIGNIFICANT CHANGE UP (ref 0.88–1.16)
KETONES UR-MCNC: ABNORMAL
KETONES URINE: NORMAL
LDH SERPL L TO P-CCNC: 200 U/L — HIGH (ref 98–192)
LEUKOCYTE ESTERASE UR-ACNC: NEGATIVE — SIGNIFICANT CHANGE UP
LEUKOCYTE ESTERASE URINE: NEGATIVE
LYMPHOCYTES # BLD AUTO: 1.52 K/UL — SIGNIFICANT CHANGE UP (ref 1–3.3)
LYMPHOCYTES # BLD AUTO: 19.4 % — SIGNIFICANT CHANGE UP (ref 13–44)
MCHC RBC-ENTMCNC: 27.3 PG — SIGNIFICANT CHANGE UP (ref 27–34)
MCHC RBC-ENTMCNC: 33.6 GM/DL — SIGNIFICANT CHANGE UP (ref 32–36)
MCV RBC AUTO: 81.3 FL — SIGNIFICANT CHANGE UP (ref 80–100)
MONOCYTES # BLD AUTO: 0.68 K/UL — SIGNIFICANT CHANGE UP (ref 0–0.9)
MONOCYTES NFR BLD AUTO: 8.7 % — SIGNIFICANT CHANGE UP (ref 2–14)
NEUTROPHILS # BLD AUTO: 5.56 K/UL — SIGNIFICANT CHANGE UP (ref 1.8–7.4)
NEUTROPHILS NFR BLD AUTO: 70.7 % — SIGNIFICANT CHANGE UP (ref 43–77)
NITRITE UR-MCNC: NEGATIVE — SIGNIFICANT CHANGE UP
NITRITE URINE: NEGATIVE
PH UR: 7 — SIGNIFICANT CHANGE UP (ref 5–8)
PH URINE: 6.5
PLATELET # BLD AUTO: 221 K/UL — SIGNIFICANT CHANGE UP (ref 150–400)
POTASSIUM SERPL-MCNC: 3.8 MMOL/L — SIGNIFICANT CHANGE UP (ref 3.5–5.3)
POTASSIUM SERPL-SCNC: 3.8 MMOL/L — SIGNIFICANT CHANGE UP (ref 3.5–5.3)
PROT ?TM UR-MCNC: 16 MG/DL — HIGH (ref 0–12)
PROT SERPL-MCNC: 6.5 G/DL — LOW (ref 6.6–8.7)
PROT UR-MCNC: NEGATIVE — SIGNIFICANT CHANGE UP
PROT/CREAT UR-RTO: 0.2 RATIO — SIGNIFICANT CHANGE UP
PROTEIN URINE: ABNORMAL
PROTHROM AB SERPL-ACNC: 11.5 SEC — SIGNIFICANT CHANGE UP (ref 10.5–13.4)
RBC # BLD: 4.32 M/UL — SIGNIFICANT CHANGE UP (ref 3.8–5.2)
RBC # FLD: 13.7 % — SIGNIFICANT CHANGE UP (ref 10.3–14.5)
RBC CASTS # UR COMP ASSIST: SIGNIFICANT CHANGE UP /HPF (ref 0–4)
SARS-COV-2 IGG+IGM SERPL QL IA: >250 U/ML — HIGH
SARS-COV-2 IGG+IGM SERPL QL IA: POSITIVE
SARS-COV-2 RNA SPEC QL NAA+PROBE: SIGNIFICANT CHANGE UP
SODIUM SERPL-SCNC: 138 MMOL/L — SIGNIFICANT CHANGE UP (ref 135–145)
SP GR SPEC: 1.01 — SIGNIFICANT CHANGE UP (ref 1.01–1.02)
SPECIFIC GRAVITY URINE: 1.03
URATE SERPL-MCNC: 6.4 MG/DL — HIGH (ref 2.4–5.7)
UROBILINOGEN FLD QL: NEGATIVE MG/DL — SIGNIFICANT CHANGE UP
UROBILINOGEN URINE: NORMAL
WBC # BLD: 7.85 K/UL — SIGNIFICANT CHANGE UP (ref 3.8–10.5)
WBC # FLD AUTO: 7.85 K/UL — SIGNIFICANT CHANGE UP (ref 3.8–10.5)
WBC UR QL: SIGNIFICANT CHANGE UP /HPF (ref 0–5)

## 2022-08-31 PROCEDURE — 49203: CPT | Mod: 59

## 2022-08-31 PROCEDURE — 59515 CESAREAN DELIVERY: CPT | Mod: U9

## 2022-08-31 PROCEDURE — 59025 FETAL NON-STRESS TEST: CPT | Mod: 26

## 2022-08-31 RX ORDER — OXYTOCIN 10 UNIT/ML
333.33 VIAL (ML) INJECTION
Qty: 20 | Refills: 0 | Status: DISCONTINUED | OUTPATIENT
Start: 2022-08-31 | End: 2022-09-02

## 2022-08-31 RX ORDER — NALOXONE HYDROCHLORIDE 4 MG/.1ML
0.1 SPRAY NASAL
Refills: 0 | Status: DISCONTINUED | OUTPATIENT
Start: 2022-08-31 | End: 2022-09-02

## 2022-08-31 RX ORDER — LANOLIN
1 OINTMENT (GRAM) TOPICAL EVERY 6 HOURS
Refills: 0 | Status: DISCONTINUED | OUTPATIENT
Start: 2022-08-31 | End: 2022-09-02

## 2022-08-31 RX ORDER — MAGNESIUM HYDROXIDE 400 MG/1
30 TABLET, CHEWABLE ORAL
Refills: 0 | Status: DISCONTINUED | OUTPATIENT
Start: 2022-08-31 | End: 2022-09-02

## 2022-08-31 RX ORDER — IBUPROFEN 200 MG
600 TABLET ORAL EVERY 6 HOURS
Refills: 0 | Status: COMPLETED | OUTPATIENT
Start: 2022-08-31 | End: 2023-07-30

## 2022-08-31 RX ORDER — TETANUS TOXOID, REDUCED DIPHTHERIA TOXOID AND ACELLULAR PERTUSSIS VACCINE, ADSORBED 5; 2.5; 8; 8; 2.5 [IU]/.5ML; [IU]/.5ML; UG/.5ML; UG/.5ML; UG/.5ML
0.5 SUSPENSION INTRAMUSCULAR ONCE
Refills: 0 | Status: COMPLETED | OUTPATIENT
Start: 2022-08-31

## 2022-08-31 RX ORDER — CITRIC ACID/SODIUM CITRATE 300-500 MG
30 SOLUTION, ORAL ORAL ONCE
Refills: 0 | Status: DISCONTINUED | OUTPATIENT
Start: 2022-08-31 | End: 2022-08-31

## 2022-08-31 RX ORDER — SODIUM CHLORIDE 9 MG/ML
1000 INJECTION, SOLUTION INTRAVENOUS
Refills: 0 | Status: DISCONTINUED | OUTPATIENT
Start: 2022-08-31 | End: 2022-09-02

## 2022-08-31 RX ORDER — DEXAMETHASONE 0.5 MG/5ML
4 ELIXIR ORAL EVERY 6 HOURS
Refills: 0 | Status: DISCONTINUED | OUTPATIENT
Start: 2022-08-31 | End: 2022-09-02

## 2022-08-31 RX ORDER — TRANEXAMIC ACID 100 MG/ML
1000 INJECTION, SOLUTION INTRAVENOUS ONCE
Refills: 0 | Status: COMPLETED | OUTPATIENT
Start: 2022-08-31 | End: 2022-08-31

## 2022-08-31 RX ORDER — OXYCODONE HYDROCHLORIDE 5 MG/1
5 TABLET ORAL
Refills: 0 | Status: COMPLETED | OUTPATIENT
Start: 2022-08-31 | End: 2022-09-07

## 2022-08-31 RX ORDER — SIMETHICONE 80 MG/1
80 TABLET, CHEWABLE ORAL EVERY 4 HOURS
Refills: 0 | Status: DISCONTINUED | OUTPATIENT
Start: 2022-08-31 | End: 2022-09-02

## 2022-08-31 RX ORDER — ASCORBIC ACID 60 MG
500 TABLET,CHEWABLE ORAL DAILY
Refills: 0 | Status: DISCONTINUED | OUTPATIENT
Start: 2022-08-31 | End: 2022-09-02

## 2022-08-31 RX ORDER — KETOROLAC TROMETHAMINE 30 MG/ML
30 SYRINGE (ML) INJECTION EVERY 6 HOURS
Refills: 0 | Status: DISCONTINUED | OUTPATIENT
Start: 2022-08-31 | End: 2022-09-01

## 2022-08-31 RX ORDER — LABETALOL HCL 100 MG
200 TABLET ORAL ONCE
Refills: 0 | Status: COMPLETED | OUTPATIENT
Start: 2022-08-31 | End: 2022-08-31

## 2022-08-31 RX ORDER — FAMOTIDINE 10 MG/ML
20 INJECTION INTRAVENOUS ONCE
Refills: 0 | Status: COMPLETED | OUTPATIENT
Start: 2022-08-31 | End: 2022-08-31

## 2022-08-31 RX ORDER — ACETAMINOPHEN 500 MG
975 TABLET ORAL
Refills: 0 | Status: DISCONTINUED | OUTPATIENT
Start: 2022-08-31 | End: 2022-09-02

## 2022-08-31 RX ORDER — FERROUS SULFATE 325(65) MG
325 TABLET ORAL DAILY
Refills: 0 | Status: DISCONTINUED | OUTPATIENT
Start: 2022-08-31 | End: 2022-09-02

## 2022-08-31 RX ORDER — SODIUM CHLORIDE 9 MG/ML
1000 INJECTION, SOLUTION INTRAVENOUS
Refills: 0 | Status: DISCONTINUED | OUTPATIENT
Start: 2022-08-31 | End: 2022-08-31

## 2022-08-31 RX ORDER — SODIUM CHLORIDE 9 MG/ML
1000 INJECTION, SOLUTION INTRAVENOUS ONCE
Refills: 0 | Status: COMPLETED | OUTPATIENT
Start: 2022-08-31 | End: 2022-08-31

## 2022-08-31 RX ORDER — OXYCODONE HYDROCHLORIDE 5 MG/1
5 TABLET ORAL ONCE
Refills: 0 | Status: DISCONTINUED | OUTPATIENT
Start: 2022-08-31 | End: 2022-09-02

## 2022-08-31 RX ORDER — KETOROLAC TROMETHAMINE 30 MG/ML
30 SYRINGE (ML) INJECTION ONCE
Refills: 0 | Status: DISCONTINUED | OUTPATIENT
Start: 2022-08-31 | End: 2022-09-02

## 2022-08-31 RX ORDER — CEFAZOLIN SODIUM 1 G
2000 VIAL (EA) INJECTION ONCE
Refills: 0 | Status: COMPLETED | OUTPATIENT
Start: 2022-08-31 | End: 2022-08-31

## 2022-08-31 RX ORDER — FOLIC ACID 0.8 MG
1 TABLET ORAL DAILY
Refills: 0 | Status: DISCONTINUED | OUTPATIENT
Start: 2022-08-31 | End: 2022-09-01

## 2022-08-31 RX ORDER — DIPHENHYDRAMINE HCL 50 MG
25 CAPSULE ORAL EVERY 6 HOURS
Refills: 0 | Status: DISCONTINUED | OUTPATIENT
Start: 2022-08-31 | End: 2022-09-02

## 2022-08-31 RX ORDER — ONDANSETRON 8 MG/1
4 TABLET, FILM COATED ORAL EVERY 6 HOURS
Refills: 0 | Status: DISCONTINUED | OUTPATIENT
Start: 2022-08-31 | End: 2022-09-02

## 2022-08-31 RX ORDER — ENOXAPARIN SODIUM 100 MG/ML
40 INJECTION SUBCUTANEOUS EVERY 24 HOURS
Refills: 0 | Status: DISCONTINUED | OUTPATIENT
Start: 2022-08-31 | End: 2022-09-02

## 2022-08-31 RX ORDER — ACETAMINOPHEN 500 MG
1000 TABLET ORAL ONCE
Refills: 0 | Status: COMPLETED | OUTPATIENT
Start: 2022-08-31 | End: 2022-08-31

## 2022-08-31 RX ADMIN — TRANEXAMIC ACID 220 MILLIGRAM(S): 100 INJECTION, SOLUTION INTRAVENOUS at 11:11

## 2022-08-31 RX ADMIN — Medication 30 MILLIGRAM(S): at 14:25

## 2022-08-31 RX ADMIN — FAMOTIDINE 20 MILLIGRAM(S): 10 INJECTION INTRAVENOUS at 10:54

## 2022-08-31 RX ADMIN — Medication 30 MILLIGRAM(S): at 13:55

## 2022-08-31 RX ADMIN — Medication 400 MILLIGRAM(S): at 14:35

## 2022-08-31 RX ADMIN — ENOXAPARIN SODIUM 40 MILLIGRAM(S): 100 INJECTION SUBCUTANEOUS at 23:49

## 2022-08-31 RX ADMIN — Medication 30 MILLIGRAM(S): at 23:23

## 2022-08-31 RX ADMIN — Medication 1000 MILLIUNIT(S)/MIN: at 12:23

## 2022-08-31 RX ADMIN — SODIUM CHLORIDE 2000 MILLILITER(S): 9 INJECTION, SOLUTION INTRAVENOUS at 10:55

## 2022-08-31 RX ADMIN — Medication 1000 MILLIGRAM(S): at 15:00

## 2022-08-31 RX ADMIN — Medication 200 MILLIGRAM(S): at 18:04

## 2022-08-31 RX ADMIN — Medication 4 MILLIGRAM(S): at 16:56

## 2022-08-31 RX ADMIN — Medication 100 MILLIGRAM(S): at 11:11

## 2022-08-31 RX ADMIN — Medication 200 MILLIGRAM(S): at 10:55

## 2022-08-31 NOTE — OB RN PATIENT PROFILE - DOES PATIENT HAVE ADVANCE DIRECTIVE

## 2022-08-31 NOTE — OB PROVIDER DELIVERY SUMMARY - NSSELHIDDEN_OBGYN_ALL_OB_FT
[NS_DeliveryAttending1_OBGYN_ALL_OB_FT:[id number]],[NS_DeliveryAssist2_OBGYN_ALL_OB_FT:[id number]],[NS_DeliveryRN_OBGYN_ALL_OB_FT:[id number]]

## 2022-08-31 NOTE — OB RN INTRAOPERATIVE NOTE - NSSELHIDDEN_OBGYN_ALL_OB_FT
[NS_DeliveryAttending1_OBGYN_ALL_OB_FT:[id number]],[NS_DeliveryAssist2_OBGYN_ALL_OB_FT:[id number]],[NS_DeliveryRN_OBGYN_ALL_OB_FT:[id number]]

## 2022-08-31 NOTE — OB RN DELIVERY SUMMARY - NSSELHIDDEN_OBGYN_ALL_OB_FT
[NS_DeliveryAttending1_OBGYN_ALL_OB_FT:[id number]],[NS_DeliveryAssist2_OBGYN_ALL_OB_FT:[id number]],[NS_DeliveryRN_OBGYN_ALL_OB_FT:[id number]]

## 2022-08-31 NOTE — OB RN DELIVERY SUMMARY - NS_SEPSISRSKCALC_OBGYN_ALL_OB_FT
EOS calculated successfully. EOS Risk Factor: 0.5/1000 live births ([facility] national incidence); GA=39w2d; Temp=98.6; ROM=0.017; GBS='Negative'; Antibiotics='No antibiotics or any antibiotics < 2 hrs prior to birth'

## 2022-08-31 NOTE — OB PROVIDER DELIVERY SUMMARY - NS_AFTERADMROM_OBGYN_ALL_OB_DT
[FreeTextEntry1] : This is a 72-year-old female whose history has been reviewed above\par \par She has a history of hypertension her blood pressure is under excellent control she has no orthostasis no medication changes\par \par She has a history of hypercholesterolemia she remains on a statin a cholesterol profile has been obtained medication changes predicated on the results\par \par Although she did have an MRI in the distant past cardiac cath showed only nonobstructive disease she has been followed by cardiology been symptom-free her EKG at cardiology was normal no intervention\par \par She has a history of mood disorder her psychiatrist has retired we will prescribe in the interim while she tries to find another mental health professional\par \par She has a history of breast carcinoma she remains on anastrozole and follows up appropriately\par \par She is up-to-date with COVID vaccinations\par \par She believes she did have COVID in December appropriate antibody studies were drawn\par  31-Aug-2022 11:44

## 2022-08-31 NOTE — OB PROVIDER DELIVERY SUMMARY - NSPROVIDERDELIVERYNOTE_OBGYN_ALL_OB_FT
Pt taken to the OR for scheduled repeat C/S.  Not in labor.  Spinal anesthesia.  Repeat  section was performed.  Delivered live female infant, vtx, through moderate amount of clear amniotic fluid.  No nuchal cord.  Uterus and ovaries WNL. left and right small paratubal cyst cauterized and drained.  Hysterotomy was reapproximated with PDS 1 suture, excellent hemostasis obtained.  Peritoneum and rectus muscle reapproximated with 3-0 Vicryl suture. Fascia reapproximated with Vicryl 0 suture, excellent hemostasis obtained.  Skin closed with 3-0 Monocryl on Osei.    [place] weight 3810g.  Skin-to-skin initiated in OR and continued into RR.  APGAR 9/9.   QBL: 689mL  Uop: 200mL  IVF: 1100mL  No intraoperative complications

## 2022-09-01 ENCOUNTER — TRANSCRIPTION ENCOUNTER (OUTPATIENT)
Age: 27
End: 2022-09-01

## 2022-09-01 ENCOUNTER — NON-APPOINTMENT (OUTPATIENT)
Age: 27
End: 2022-09-01

## 2022-09-01 DIAGNOSIS — O10.919 UNSPECIFIED PRE-EXISTING HYPERTENSION COMPLICATING PREGNANCY, UNSPECIFIED TRIMESTER: ICD-10-CM

## 2022-09-01 LAB
BASOPHILS # BLD AUTO: 0.02 K/UL — SIGNIFICANT CHANGE UP (ref 0–0.2)
BASOPHILS NFR BLD AUTO: 0.2 % — SIGNIFICANT CHANGE UP (ref 0–2)
EOSINOPHIL # BLD AUTO: 0 K/UL — SIGNIFICANT CHANGE UP (ref 0–0.5)
EOSINOPHIL NFR BLD AUTO: 0 % — SIGNIFICANT CHANGE UP (ref 0–6)
HCT VFR BLD CALC: 27.6 % — LOW (ref 34.5–45)
HGB BLD-MCNC: 9.3 G/DL — LOW (ref 11.5–15.5)
IMM GRANULOCYTES NFR BLD AUTO: 0.7 % — SIGNIFICANT CHANGE UP (ref 0–1.5)
LYMPHOCYTES # BLD AUTO: 1.89 K/UL — SIGNIFICANT CHANGE UP (ref 1–3.3)
LYMPHOCYTES # BLD AUTO: 18.1 % — SIGNIFICANT CHANGE UP (ref 13–44)
MCHC RBC-ENTMCNC: 27.6 PG — SIGNIFICANT CHANGE UP (ref 27–34)
MCHC RBC-ENTMCNC: 33.7 GM/DL — SIGNIFICANT CHANGE UP (ref 32–36)
MCV RBC AUTO: 81.9 FL — SIGNIFICANT CHANGE UP (ref 80–100)
MONOCYTES # BLD AUTO: 1.05 K/UL — HIGH (ref 0–0.9)
MONOCYTES NFR BLD AUTO: 10 % — SIGNIFICANT CHANGE UP (ref 2–14)
NEUTROPHILS # BLD AUTO: 7.43 K/UL — HIGH (ref 1.8–7.4)
NEUTROPHILS NFR BLD AUTO: 71 % — SIGNIFICANT CHANGE UP (ref 43–77)
PLATELET # BLD AUTO: 212 K/UL — SIGNIFICANT CHANGE UP (ref 150–400)
RBC # BLD: 3.37 M/UL — LOW (ref 3.8–5.2)
RBC # FLD: 13.8 % — SIGNIFICANT CHANGE UP (ref 10.3–14.5)
T PALLIDUM AB TITR SER: NEGATIVE — SIGNIFICANT CHANGE UP
WBC # BLD: 10.46 K/UL — SIGNIFICANT CHANGE UP (ref 3.8–10.5)
WBC # FLD AUTO: 10.46 K/UL — SIGNIFICANT CHANGE UP (ref 3.8–10.5)

## 2022-09-01 RX ORDER — FOLIC ACID 0.8 MG
1 TABLET ORAL DAILY
Refills: 0 | Status: DISCONTINUED | OUTPATIENT
Start: 2022-09-01 | End: 2022-09-02

## 2022-09-01 RX ORDER — OXYCODONE HYDROCHLORIDE 5 MG/1
5 TABLET ORAL ONCE
Refills: 0 | Status: DISCONTINUED | OUTPATIENT
Start: 2022-09-01 | End: 2022-09-01

## 2022-09-01 RX ORDER — TETANUS TOXOID, REDUCED DIPHTHERIA TOXOID AND ACELLULAR PERTUSSIS VACCINE, ADSORBED 5; 2.5; 8; 8; 2.5 [IU]/.5ML; [IU]/.5ML; UG/.5ML; UG/.5ML; UG/.5ML
0.5 SUSPENSION INTRAMUSCULAR ONCE
Refills: 0 | Status: COMPLETED | OUTPATIENT
Start: 2022-09-01 | End: 2022-09-01

## 2022-09-01 RX ORDER — IBUPROFEN 200 MG
600 TABLET ORAL EVERY 6 HOURS
Refills: 0 | Status: DISCONTINUED | OUTPATIENT
Start: 2022-09-01 | End: 2022-09-02

## 2022-09-01 RX ADMIN — OXYCODONE HYDROCHLORIDE 5 MILLIGRAM(S): 5 TABLET ORAL at 20:22

## 2022-09-01 RX ADMIN — ENOXAPARIN SODIUM 40 MILLIGRAM(S): 100 INJECTION SUBCUTANEOUS at 23:10

## 2022-09-01 RX ADMIN — Medication 600 MILLIGRAM(S): at 12:28

## 2022-09-01 RX ADMIN — Medication 975 MILLIGRAM(S): at 14:07

## 2022-09-01 RX ADMIN — Medication 1 TABLET(S): at 13:07

## 2022-09-01 RX ADMIN — Medication 500 MILLIGRAM(S): at 12:28

## 2022-09-01 RX ADMIN — Medication 200 MILLIGRAM(S): at 17:20

## 2022-09-01 RX ADMIN — Medication 975 MILLIGRAM(S): at 20:22

## 2022-09-01 RX ADMIN — OXYCODONE HYDROCHLORIDE 5 MILLIGRAM(S): 5 TABLET ORAL at 20:52

## 2022-09-01 RX ADMIN — Medication 600 MILLIGRAM(S): at 17:20

## 2022-09-01 RX ADMIN — Medication 325 MILLIGRAM(S): at 12:28

## 2022-09-01 RX ADMIN — TETANUS TOXOID, REDUCED DIPHTHERIA TOXOID AND ACELLULAR PERTUSSIS VACCINE, ADSORBED 0.5 MILLILITER(S): 5; 2.5; 8; 8; 2.5 SUSPENSION INTRAMUSCULAR at 23:10

## 2022-09-01 RX ADMIN — Medication 30 MILLIGRAM(S): at 05:58

## 2022-09-01 RX ADMIN — Medication 200 MILLIGRAM(S): at 06:23

## 2022-09-01 RX ADMIN — Medication 1 MILLIGRAM(S): at 12:27

## 2022-09-01 RX ADMIN — Medication 975 MILLIGRAM(S): at 09:06

## 2022-09-01 RX ADMIN — Medication 600 MILLIGRAM(S): at 23:47

## 2022-09-01 NOTE — PROGRESS NOTE ADULT - SUBJECTIVE AND OBJECTIVE BOX
POD # 1    Patient resting comfortably in NAD  Afebrile VSS  Abdomen soft not tender  Incision  Dressing in place  dry  Extrem.  No homans   Lochia Nl    Flatus +  Voiding +     WBC 10.46  H / H 9.3 27.6  Platel  212  VDRL Neg  O +  COVID Neg Ab +  HIV  Neg    Patient s/p R c/s  Continue post op care

## 2022-09-01 NOTE — PROGRESS NOTE ADULT - ATTENDING COMMENTS
27y  stable on POD1 s/p rCS @ 39w3d, antepartum course c/b cHTN on Labetalol 200 mg BID, no s/s postpartum pre-eclampsia.

## 2022-09-01 NOTE — PROGRESS NOTE ADULT - SUBJECTIVE AND OBJECTIVE BOX
PARIS SAL is a 27y  on POD1 s/p rCS @ 39w3d, antepartum course c/b cHTN    SUBJECTIVE:  No acute events overnight, patient has no complaints.  Pain is well controlled with current treatment regimen.  ** flatus, ** BM, ** voiding, ** ambulating, ** tolerating PO.  Appropriate lochia, which is decreasing.   Breastfeeding without difficulty.  Denies fever, chills, nausea, and vomiting.  She denies lightheadedness, dizziness, HA, blurry vision, palpitations, chest pain and SOB.     OBJECTIVE:  Physical exam:  General: AOx3, NAD.  Heart: RRR  Lungs: CTAB  Abdomen: Soft, appropriately tender to palpitation, firm uterine fundus at umbilicus. Dressing removed revealing clean dry and intact incision **  : dailey in place draining clear, yellow urine  Vaginal: minimal blood on pad, no bleeding on palpation of fundus  Ext: No DVT signs, warm extremities.    Vital Signs Last 24 Hrs  T(C): 36.8 (01 Sep 2022 04:28), Max: 37 (31 Aug 2022 11:21)  T(F): 98.3 (01 Sep 2022 04:28), Max: 98.6 (31 Aug 2022 11:21)  HR: 81 (01 Sep 2022 06:20) (67 - 86)  BP: 122/77 (01 Sep 2022 06:20) (114/74 - 135/73)  RR: 18 (01 Sep 2022 04:28) (16 - 20)  SpO2: 96% (01 Sep 2022 04:28) (96% - 100%)    LABS:                        11.8   7.85  )-----------( 221      ( 31 Aug 2022 10:15 )             35.1        PARIS SAL is a 27y  on POD1 s/p rCS @ 39w3d, antepartum course c/b cHTN    SUBJECTIVE:  No acute events overnight, patient has no complaints.  Pain is well controlled with current treatment regimen.  + flatus, - BM, +voiding, + ambulating, +tolerating PO.  Moderate lochia, +passage of clots  Breastfeeding without difficulty.  Denies fever, chills, nausea, and vomiting.  She denies lightheadedness, dizziness, HA, blurry vision, palpitations, chest pain and SOB.     OBJECTIVE:  Physical exam:  General: AOx3, NAD.  Heart: RRR  Lungs: CTAB  Abdomen: Soft, appropriately tender to palpitation, firm uterine fundus at umbilicus. Mepilex dressing dry and in place.  Vaginal: passage of clot 1x1cm dark red clot on palpation of fundus  Ext: No DVT signs, warm extremities.    Vital Signs Last 24 Hrs  T(C): 36.8 (01 Sep 2022 04:28), Max: 37 (31 Aug 2022 11:21)  T(F): 98.3 (01 Sep 2022 04:28), Max: 98.6 (31 Aug 2022 11:21)  HR: 81 (01 Sep 2022 06:20) (67 - 86)  BP: 122/77 (01 Sep 2022 06:20) (114/74 - 135/73)  RR: 18 (01 Sep 2022 04:28) (16 - 20)  SpO2: 96% (01 Sep 2022 04:28) (96% - 100%)    LABS:                        11.8   7.85  )-----------( 221      ( 31 Aug 2022 10:15 )             35.1

## 2022-09-01 NOTE — PROGRESS NOTE ADULT - ASSESSMENT
A/P: PARIS SAL is a 27y  on POD1 s/p rCS @ 39w3d, antepartum course c/b cHTN    # cHTN  - BPs O/N wnl (120s/70s-80s)  - denies preeclampsia symptoms  - c/w labetalol 200 BID    #Routine postpartum care  - Stable, doing well postpartum  - Hgb 11.8 > am cbc pending  - Pain: well controlled, c/w current regimen **  - GI: c/w regular diet, normal bowel function **  - : voiding *** , lochia decreasing ***  - DVT ppx: SCDs, ambulation encouraged, lovenox  - Healthy baby girl / breastfeeding ***  - MMR pending  - Dispo: home tomorrow pending attending approval A/P: PARIS SAL is a 27y  on POD1 s/p rCS @ 39w3d, antepartum course c/b cHTN    # cHTN  - BPs O/N wnl (120s/70s-80s)  - denies preeclampsia symptoms  - c/w labetalol 200 BID    #Routine postpartum care  - Stable, doing well postpartum  - Hgb 11.8 > am cbc pending  - Pain: well controlled, c/w current regimen   - GI: c/w regular diet, normal bowel function   - : voiding , moderate lochia - will continue to monitor  - DVT ppx: SCDs, ambulation encouraged, lovenox  - Healthy baby girl / breastfeeding   - MMR pending  - Dispo: home tomorrow pending attending approval

## 2022-09-02 VITALS
SYSTOLIC BLOOD PRESSURE: 111 MMHG | OXYGEN SATURATION: 98 % | RESPIRATION RATE: 18 BRPM | DIASTOLIC BLOOD PRESSURE: 66 MMHG | HEART RATE: 79 BPM | TEMPERATURE: 99 F

## 2022-09-02 LAB — BACTERIA UR CULT: ABNORMAL

## 2022-09-02 PROCEDURE — 81001 URINALYSIS AUTO W/SCOPE: CPT

## 2022-09-02 PROCEDURE — U0005: CPT

## 2022-09-02 PROCEDURE — 76815 OB US LIMITED FETUS(S): CPT

## 2022-09-02 PROCEDURE — 85730 THROMBOPLASTIN TIME PARTIAL: CPT

## 2022-09-02 PROCEDURE — 84156 ASSAY OF PROTEIN URINE: CPT

## 2022-09-02 PROCEDURE — 86769 SARS-COV-2 COVID-19 ANTIBODY: CPT

## 2022-09-02 PROCEDURE — 85384 FIBRINOGEN ACTIVITY: CPT

## 2022-09-02 PROCEDURE — 86900 BLOOD TYPING SEROLOGIC ABO: CPT

## 2022-09-02 PROCEDURE — 85025 COMPLETE CBC W/AUTO DIFF WBC: CPT

## 2022-09-02 PROCEDURE — 84550 ASSAY OF BLOOD/URIC ACID: CPT

## 2022-09-02 PROCEDURE — U0003: CPT

## 2022-09-02 PROCEDURE — 86780 TREPONEMA PALLIDUM: CPT

## 2022-09-02 PROCEDURE — 82570 ASSAY OF URINE CREATININE: CPT

## 2022-09-02 PROCEDURE — 85610 PROTHROMBIN TIME: CPT

## 2022-09-02 PROCEDURE — 59025 FETAL NON-STRESS TEST: CPT

## 2022-09-02 PROCEDURE — 59050 FETAL MONITOR W/REPORT: CPT

## 2022-09-02 PROCEDURE — 86850 RBC ANTIBODY SCREEN: CPT

## 2022-09-02 PROCEDURE — 80053 COMPREHEN METABOLIC PANEL: CPT

## 2022-09-02 PROCEDURE — G0463: CPT

## 2022-09-02 PROCEDURE — 83615 LACTATE (LD) (LDH) ENZYME: CPT

## 2022-09-02 PROCEDURE — 90715 TDAP VACCINE 7 YRS/> IM: CPT

## 2022-09-02 PROCEDURE — 36415 COLL VENOUS BLD VENIPUNCTURE: CPT

## 2022-09-02 PROCEDURE — 90707 MMR VACCINE SC: CPT

## 2022-09-02 PROCEDURE — 86901 BLOOD TYPING SEROLOGIC RH(D): CPT

## 2022-09-02 RX ORDER — FERROUS SULFATE 325(65) MG
1 TABLET ORAL
Qty: 30 | Refills: 0
Start: 2022-09-02 | End: 2022-10-01

## 2022-09-02 RX ORDER — ACETAMINOPHEN 500 MG
3 TABLET ORAL
Qty: 48 | Refills: 0
Start: 2022-09-02 | End: 2022-09-05

## 2022-09-02 RX ORDER — OXYCODONE HYDROCHLORIDE 5 MG/1
5 TABLET ORAL
Refills: 0 | Status: DISCONTINUED | OUTPATIENT
Start: 2022-09-02 | End: 2022-09-02

## 2022-09-02 RX ORDER — IBUPROFEN 200 MG
1 TABLET ORAL
Qty: 28 | Refills: 0
Start: 2022-09-02 | End: 2022-09-08

## 2022-09-02 RX ORDER — OXYCODONE HYDROCHLORIDE 5 MG/1
1 TABLET ORAL
Qty: 12 | Refills: 0
Start: 2022-09-02 | End: 2022-09-04

## 2022-09-02 RX ORDER — POLYETHYLENE GLYCOL 3350 17 G/17G
17 POWDER, FOR SOLUTION ORAL
Qty: 119 | Refills: 0
Start: 2022-09-02 | End: 2022-09-08

## 2022-09-02 RX ADMIN — Medication 975 MILLIGRAM(S): at 02:35

## 2022-09-02 RX ADMIN — OXYCODONE HYDROCHLORIDE 5 MILLIGRAM(S): 5 TABLET ORAL at 03:05

## 2022-09-02 RX ADMIN — Medication 600 MILLIGRAM(S): at 05:57

## 2022-09-02 RX ADMIN — Medication 0.5 MILLILITER(S): at 10:27

## 2022-09-02 RX ADMIN — Medication 200 MILLIGRAM(S): at 05:57

## 2022-09-02 RX ADMIN — Medication 975 MILLIGRAM(S): at 08:40

## 2022-09-02 RX ADMIN — OXYCODONE HYDROCHLORIDE 5 MILLIGRAM(S): 5 TABLET ORAL at 02:35

## 2022-09-02 NOTE — PROGRESS NOTE ADULT - SUBJECTIVE AND OBJECTIVE BOX
PARIS SAL is a 27y  on POD2 s/p rCS @ 39w3d, antepartum course c/b cHTN    SUBJECTIVE:  No acute events overnight, patient has no complaints.  Pain is well controlled with current treatment regimen.  + flatus, +BM, +voiding, + ambulating, +tolerating PO.  REports improvement of lochia, decreasing  Breastfeeding without difficulty.  Denies fever, chills, nausea, and vomiting.  She denies lightheadedness, dizziness, HA, blurry vision, palpitations, chest pain and SOB.     OBJECTIVE:  Physical exam:  General: AOx3, NAD.  Heart: RRR  Lungs: CTAB  Abdomen: Soft, appropriately tender to palpitation, firm uterine fundus at umbilicus. Mepilex dressing dry and in place.  Vaginal: passage of clot 1x1cm dark red clot on palpation of fundus  Ext: No DVT signs, warm extremities.    ICU Vital Signs Last 24 Hrs  T(C): 37.1 (02 Sep 2022 04:51), Max: 37.1 (02 Sep 2022 04:51)  T(F): 98.8 (02 Sep 2022 04:51), Max: 98.8 (02 Sep 2022 04:51)  HR: 79 (02 Sep 2022 04:51) (79 - 86)  BP: 111/66 (02 Sep 2022 04:51) (111/66 - 129/77)  RR: 18 (02 Sep 2022 04:51) (18 - 18)  SpO2: 98% (02 Sep 2022 04:51) (98% - 100%)      LABS:                          9.3    10.46 )-----------( 212      ( 01 Sep 2022 07:38 )             27.6

## 2022-09-02 NOTE — DISCHARGE NOTE OB - CARE PLAN
Principal Discharge DX:	Normal spontaneous vaginal delivery  Assessment and plan of treatment:	- Please call your provider to schedule a postpartum visit in 2 weeks for a blood pressure check. Contact information provided below.  - Prescriptions have been sent to pharmacy. Please take medications as directed.   - Patient is to continue with regular diet and activity as tolerated, nothing per vagina for 6 weeks, and exclusive breast feeding for the first 6 months is recommended.   - If you have additional concerns, or if you experience fevers > 100.4 F or heavy vaginal bleeding that is not decreasing, please inform your provider.   1

## 2022-09-02 NOTE — PROGRESS NOTE ADULT - ASSESSMENT
A/P: PARIS SAL is a 27y  on POD2 s/p rCS @ 39w3d, antepartum course c/b cHTN    # cHTN  - BPs O/N wnl   - denies preeclampsia symptoms  - c/w labetalol 200 BID    #Routine postpartum care  - Stable, doing well postpartum  - Hgb 11.8 >9.3  - Pain: well controlled, c/w current regimen   - GI: c/w regular diet, normal bowel function   - : voiding , lochia decreasing  - DVT ppx: SCDs, ambulation encouraged, lovenox  - Healthy baby girl / breastfeeding   - MMR pending  - Dispo: home todaypending attending approval

## 2022-09-02 NOTE — DISCHARGE NOTE OB - PATIENT PORTAL LINK FT
You can access the FollowMyHealth Patient Portal offered by [facility] by registering at the following website: [email or web address]. By joining [facility]’s FollowMyHealth portal, you will also be able to view your health information using other applications (apps) compatible with our system.

## 2022-09-02 NOTE — DISCHARGE NOTE OB - HOSPITAL COURSE
Pt is 28 yo  s/p repeat  section @39w3d for cHTN. Surgery was uncomplicated. She was transferred from L&D to postpartum unit without complications during her stay. Upon discharge she is voiding, tolerating PO, ambulating, lochia is decreasing, labs and vitals were stable, and pain is well controlled.

## 2022-09-02 NOTE — DISCHARGE NOTE OB - MEDICATION SUMMARY - MEDICATIONS TO TAKE
I will START or STAY ON the medications listed below when I get home from the hospital:    ibuprofen 600 mg oral tablet  -- 1 tab(s) by mouth every 6 hours, As Needed -for mild pain   -- Do not take this drug if you are pregnant.  It is very important that you take or use this exactly as directed.  Do not skip doses or discontinue unless directed by your doctor.  May cause drowsiness or dizziness.  Obtain medical advice before taking any non-prescription drugs as some may affect the action of this medication.  Take with food or milk.    -- Indication: For pain as needed    Tylenol 325 mg oral capsule  -- 3 cap(s) by mouth every 6 hours, As Needed -for mild pain   -- Indication: For pain as needed    ferrous sulfate 325 mg (65 mg elemental iron) oral tablet  -- 1 tab(s) by mouth once a day   -- Check with your doctor before becoming pregnant.  Do not chew, break, or crush.  May discolor urine or feces.    -- Indication: For anemia    MiraLax oral powder for reconstitution  -- 17 gram(s) by mouth once a day   -- Dilute this medication with liquid before administration.  It is very important that you take or use this exactly as directed.  Do not skip doses or discontinue unless directed by your doctor.    -- Indication: For Constipation as needed

## 2022-09-02 NOTE — DISCHARGE NOTE OB - CARE PROVIDER_API CALL
Hieu Salazar)  Obstetrics and Gynecology  [address] [place] [address]  Phone: [phone]  Fax: [phone]  Established Patient  Follow Up Time: 1 week

## 2022-09-02 NOTE — DISCHARGE NOTE OB - NS MD DC FALL RISK RISK
For information on Fall & Injury Prevention, visit: [email or web address] OR  [email or web address] OR  https://www.cdc.gov/steadi/patient.html

## 2022-09-02 NOTE — PROGRESS NOTE ADULT - SUBJECTIVE AND OBJECTIVE BOX
POD # 2    Patient resting comfortably in NAD  Afebrile VSS  Abdomen  soft  not tender  fundus firm   Incision   Dressing in Place   dry   Extrem  No Homans     Flatus +  Voiding  +     Patient s/p R  c/s    Patient stable and doing well  DC to home   F/U office  3 days

## 2022-09-02 NOTE — DISCHARGE NOTE OB - PLAN OF CARE
- Please call your provider to schedule a postpartum visit in 2 weeks for a blood pressure check. Contact information provided below.  - Prescriptions have been sent to pharmacy. Please take medications as directed.   - Patient is to continue with regular diet and activity as tolerated, nothing per vagina for 6 weeks, and exclusive breast feeding for the first 6 months is recommended.   - If you have additional concerns, or if you experience fevers > 100.4 F or heavy vaginal bleeding that is not decreasing, please inform your provider.

## 2022-09-06 ENCOUNTER — APPOINTMENT (OUTPATIENT)
Dept: OBGYN | Facility: CLINIC | Age: 27
End: 2022-09-06

## 2022-09-06 VITALS
BODY MASS INDEX: 42.69 KG/M2 | HEART RATE: 97 BPM | SYSTOLIC BLOOD PRESSURE: 140 MMHG | DIASTOLIC BLOOD PRESSURE: 88 MMHG | WEIGHT: 241 LBS

## 2022-09-06 PROCEDURE — 0503F POSTPARTUM CARE VISIT: CPT

## 2022-09-06 RX ORDER — OXYCODONE AND ACETAMINOPHEN 5; 325 MG/1; MG/1
5-325 TABLET ORAL
Qty: 20 | Refills: 0 | Status: ACTIVE | COMMUNITY
Start: 2022-09-06 | End: 1900-01-01

## 2022-09-06 NOTE — HISTORY OF PRESENT ILLNESS
[Postpartum Follow Up] : postpartum follow up [Delivery Date: ___] : on [unfilled] [Repeat C/S] : delivered by  section (repeat) [Female] : Delivery History: baby girl [Breastfeeding] : currently nursing [Wt. ___] : weighing [unfilled] [Clean/Dry/Intact] : clean, dry and intact [Healed] : healed [None] : no vaginal bleeding [Not Done] : Examination of breasts not done [Doing Well] : is doing well [No Sign of Infection] : is showing no signs of infection [Excellent Pain Control] : has excellent pain control [Complications:___] : no complications [Erythema] : not erythematous [Swelling] : not swollen [Cervix Sample Taken] : cervical sample not taken for a Pap smear [de-identified] : f/u 4 weeks

## 2022-10-10 ENCOUNTER — NON-APPOINTMENT (OUTPATIENT)
Age: 27
End: 2022-10-10

## 2022-10-11 ENCOUNTER — APPOINTMENT (OUTPATIENT)
Dept: OBGYN | Facility: CLINIC | Age: 27
End: 2022-10-11

## 2022-10-11 VITALS
BODY MASS INDEX: 41.45 KG/M2 | HEART RATE: 123 BPM | SYSTOLIC BLOOD PRESSURE: 151 MMHG | WEIGHT: 234 LBS | DIASTOLIC BLOOD PRESSURE: 98 MMHG

## 2022-10-11 PROCEDURE — 0503F POSTPARTUM CARE VISIT: CPT

## 2022-10-11 NOTE — HISTORY OF PRESENT ILLNESS
[Delivery Date: ___] : on [unfilled] [Repeat C/S] : delivered by  section (repeat) [Female] : Delivery History: baby girl [Breastfeeding] : currently nursing [Complications:___] : no complications [Wt. ___] : weighing [unfilled] [Clean/Dry/Intact] : clean, dry and intact [Erythema] : not erythematous [Swelling] : not swollen [Healed] : healed [Back to Normal] : is back to normal in size [None] : no vaginal bleeding [Normal] : the vagina was normal [Cervix Sample Taken] : cervical sample not taken for a Pap smear [Not Done] : Examination of breasts not done [Doing Well] : is doing well [de-identified] : benign

## 2022-10-18 LAB
A VAGINAE DNA VAG QL NAA+PROBE: NORMAL
BVAB2 DNA VAG QL NAA+PROBE: NORMAL
C KRUSEI DNA VAG QL NAA+PROBE: NEGATIVE
MEGA1 DNA VAG QL NAA+PROBE: NORMAL
T VAGINALIS RRNA SPEC QL NAA+PROBE: NEGATIVE

## 2022-11-02 ENCOUNTER — NON-APPOINTMENT (OUTPATIENT)
Age: 27
End: 2022-11-02

## 2022-11-04 NOTE — END OF VISIT
[Time Spent: ___ minutes] : I have spent [unfilled] minutes of time on the encounter. PROVIDER:[TOKEN:[[id number]]]

## 2023-08-08 ENCOUNTER — APPOINTMENT (OUTPATIENT)
Dept: OBGYN | Facility: CLINIC | Age: 28
End: 2023-08-08
Payer: MEDICAID

## 2023-08-08 VITALS
BODY MASS INDEX: 41.45 KG/M2 | SYSTOLIC BLOOD PRESSURE: 145 MMHG | WEIGHT: 234 LBS | HEART RATE: 113 BPM | DIASTOLIC BLOOD PRESSURE: 85 MMHG

## 2023-08-08 DIAGNOSIS — O10.919 UNSPECIFIED PRE-EXISTING HYPERTENSION COMPLICATING PREGNANCY, UNSPECIFIED TRIMESTER: ICD-10-CM

## 2023-08-08 DIAGNOSIS — O35.10X0 MATERNAL CARE FOR (SUSPECTED) CHROMOSOMAL ABNORMALITY IN FETUS, UNSPECIFIED, NOT APPLICABLE OR UNSPECIFIED: ICD-10-CM

## 2023-08-08 DIAGNOSIS — O28.0 ABNORMAL HEMATOLOGICAL FINDING ON ANTENATAL SCREENING OF MOTHER: ICD-10-CM

## 2023-08-08 DIAGNOSIS — N61.0 MASTITIS WITHOUT ABSCESS: ICD-10-CM

## 2023-08-08 DIAGNOSIS — O34.219 MATERNAL CARE FOR UNSPECIFIED TYPE SCAR FROM PREVIOUS CESAREAN DELIVERY: ICD-10-CM

## 2023-08-08 DIAGNOSIS — O99.210 OBESITY COMPLICATING PREGNANCY, UNSPECIFIED TRIMESTER: ICD-10-CM

## 2023-08-08 DIAGNOSIS — Z34.90 ENCOUNTER FOR SUPERVISION OF NORMAL PREGNANCY, UNSPECIFIED, UNSPECIFIED TRIMESTER: ICD-10-CM

## 2023-08-08 DIAGNOSIS — N63.10 UNSPECIFIED LUMP IN THE RIGHT BREAST, UNSPECIFIED QUADRANT: ICD-10-CM

## 2023-08-08 DIAGNOSIS — E66.9 OBESITY, UNSPECIFIED: ICD-10-CM

## 2023-08-08 PROCEDURE — 99213 OFFICE O/P EST LOW 20 MIN: CPT

## 2023-08-08 RX ORDER — CEPHALEXIN 250 MG/1
250 TABLET ORAL EVERY 6 HOURS
Qty: 28 | Refills: 0 | Status: ACTIVE | COMMUNITY
Start: 2023-08-08 | End: 1900-01-01

## 2023-08-08 NOTE — PLAN
[FreeTextEntry1] : Patient is a 28-year-old  4 para 3 status post delivery on 2022 Patient presents complaining of right breast nodule area and tenderness consistent with probable mastitis as per patient from previous experience Physical exam reveals a well-developed well-nourished female no apparent distress,, BMI 41 Right breast evidence of a 2 to 3 cm nodule mildly tender minimal skin erythema no streaking area in the right breast upper outer quadrant consistent with a probable mastitis Patient will be started empirically on Keflex and advised that if there is no response within 2 to 3 days the most to be seen by a breast surgeon for possible incision and drain Patient patient stands

## 2023-08-08 NOTE — HISTORY OF PRESENT ILLNESS
[FreeTextEntry1] : Patient is a 28-year-old  4 para 3 status post delivery 2022 Patient presents complaining of right breast pain with a lump which patient feels is a mastitis that developed

## 2023-08-08 NOTE — PHYSICAL EXAM
[Chaperone Present] : A chaperone was present in the examining room during all aspects of the physical examination [FreeTextEntry6] : Right breast upper outer quadrant with tender to the centimeter nodule, minimal erythema, no streaking [Normal] : normal [Tenderness Of The Right Breast] : tenderness [___cm] : a ~M [unfilled] ~Ucm superior lateral quadrant mass was palpated
